# Patient Record
Sex: MALE | URBAN - METROPOLITAN AREA
[De-identification: names, ages, dates, MRNs, and addresses within clinical notes are randomized per-mention and may not be internally consistent; named-entity substitution may affect disease eponyms.]

---

## 2021-07-03 ENCOUNTER — INPATIENT (INPATIENT)
Facility: HOSPITAL | Age: 56
LOS: 0 days | Discharge: HOME | DRG: 247 | End: 2021-07-04
Attending: INTERNAL MEDICINE | Admitting: INTERNAL MEDICINE
Payer: MEDICARE

## 2021-07-03 VITALS — OXYGEN SATURATION: 85 %

## 2021-07-03 DIAGNOSIS — I21.3 ST ELEVATION (STEMI) MYOCARDIAL INFARCTION OF UNSPECIFIED SITE: ICD-10-CM

## 2021-07-03 DIAGNOSIS — Z98.1 ARTHRODESIS STATUS: Chronic | ICD-10-CM

## 2021-07-03 LAB
A1C WITH ESTIMATED AVERAGE GLUCOSE RESULT: 6.3 % — HIGH (ref 4–5.6)
ALBUMIN SERPL ELPH-MCNC: 4.2 G/DL — SIGNIFICANT CHANGE UP (ref 3.3–5)
ALP SERPL-CCNC: 73 U/L — SIGNIFICANT CHANGE UP (ref 40–120)
ALT FLD-CCNC: 18 U/L — SIGNIFICANT CHANGE UP (ref 10–45)
ANION GAP SERPL CALC-SCNC: 16 MMOL/L — SIGNIFICANT CHANGE UP (ref 5–17)
APPEARANCE UR: CLEAR — SIGNIFICANT CHANGE UP
AST SERPL-CCNC: 27 U/L — SIGNIFICANT CHANGE UP (ref 10–40)
BASOPHILS # BLD AUTO: 0.04 K/UL — SIGNIFICANT CHANGE UP (ref 0–0.2)
BASOPHILS NFR BLD AUTO: 0.4 % — SIGNIFICANT CHANGE UP (ref 0–2)
BILIRUB SERPL-MCNC: 0.4 MG/DL — SIGNIFICANT CHANGE UP (ref 0.2–1.2)
BILIRUB UR-MCNC: NEGATIVE — SIGNIFICANT CHANGE UP
BLD GP AB SCN SERPL QL: NEGATIVE — SIGNIFICANT CHANGE UP
BUN SERPL-MCNC: 7 MG/DL — SIGNIFICANT CHANGE UP (ref 7–23)
CALCIUM SERPL-MCNC: 9.6 MG/DL — SIGNIFICANT CHANGE UP (ref 8.4–10.5)
CHLORIDE SERPL-SCNC: 99 MMOL/L — SIGNIFICANT CHANGE UP (ref 96–108)
CK MB BLD-MCNC: 10.2 % — HIGH (ref 0–3.5)
CK MB BLD-MCNC: 10.6 % — HIGH (ref 0–3.5)
CK MB CFR SERPL CALC: 36.1 NG/ML — HIGH (ref 0–6.7)
CK MB CFR SERPL CALC: 40.7 NG/ML — HIGH (ref 0–6.7)
CK SERPL-CCNC: 353 U/L — HIGH (ref 30–200)
CK SERPL-CCNC: 383 U/L — HIGH (ref 30–200)
CO2 SERPL-SCNC: 22 MMOL/L — SIGNIFICANT CHANGE UP (ref 22–31)
COLOR SPEC: SIGNIFICANT CHANGE UP
CREAT SERPL-MCNC: 0.56 MG/DL — SIGNIFICANT CHANGE UP (ref 0.5–1.3)
DIFF PNL FLD: NEGATIVE — SIGNIFICANT CHANGE UP
EOSINOPHIL # BLD AUTO: 0.37 K/UL — SIGNIFICANT CHANGE UP (ref 0–0.5)
EOSINOPHIL NFR BLD AUTO: 3.4 % — SIGNIFICANT CHANGE UP (ref 0–6)
ESTIMATED AVERAGE GLUCOSE: 134 MG/DL — HIGH (ref 68–114)
GLUCOSE SERPL-MCNC: 125 MG/DL — HIGH (ref 70–99)
GLUCOSE UR QL: NEGATIVE — SIGNIFICANT CHANGE UP
HCT VFR BLD CALC: 45.9 % — SIGNIFICANT CHANGE UP (ref 39–50)
HGB BLD-MCNC: 15.7 G/DL — SIGNIFICANT CHANGE UP (ref 13–17)
IMM GRANULOCYTES NFR BLD AUTO: 0.6 % — SIGNIFICANT CHANGE UP (ref 0–1.5)
KETONES UR-MCNC: NEGATIVE — SIGNIFICANT CHANGE UP
LEUKOCYTE ESTERASE UR-ACNC: NEGATIVE — SIGNIFICANT CHANGE UP
LYMPHOCYTES # BLD AUTO: 27.7 % — SIGNIFICANT CHANGE UP (ref 13–44)
LYMPHOCYTES # BLD AUTO: 3.02 K/UL — SIGNIFICANT CHANGE UP (ref 1–3.3)
MAGNESIUM SERPL-MCNC: 2.1 MG/DL — SIGNIFICANT CHANGE UP (ref 1.6–2.6)
MCHC RBC-ENTMCNC: 30.7 PG — SIGNIFICANT CHANGE UP (ref 27–34)
MCHC RBC-ENTMCNC: 34.2 GM/DL — SIGNIFICANT CHANGE UP (ref 32–36)
MCV RBC AUTO: 89.6 FL — SIGNIFICANT CHANGE UP (ref 80–100)
MONOCYTES # BLD AUTO: 0.55 K/UL — SIGNIFICANT CHANGE UP (ref 0–0.9)
MONOCYTES NFR BLD AUTO: 5 % — SIGNIFICANT CHANGE UP (ref 2–14)
NEUTROPHILS # BLD AUTO: 6.86 K/UL — SIGNIFICANT CHANGE UP (ref 1.8–7.4)
NEUTROPHILS NFR BLD AUTO: 62.9 % — SIGNIFICANT CHANGE UP (ref 43–77)
NITRITE UR-MCNC: NEGATIVE — SIGNIFICANT CHANGE UP
NRBC # BLD: 0 /100 WBCS — SIGNIFICANT CHANGE UP (ref 0–0)
PH UR: 7 — SIGNIFICANT CHANGE UP (ref 5–8)
PHOSPHATE SERPL-MCNC: 3.2 MG/DL — SIGNIFICANT CHANGE UP (ref 2.5–4.5)
PLATELET # BLD AUTO: 230 K/UL — SIGNIFICANT CHANGE UP (ref 150–400)
POTASSIUM SERPL-MCNC: 4.2 MMOL/L — SIGNIFICANT CHANGE UP (ref 3.5–5.3)
POTASSIUM SERPL-SCNC: 4.2 MMOL/L — SIGNIFICANT CHANGE UP (ref 3.5–5.3)
PROT SERPL-MCNC: 6.9 G/DL — SIGNIFICANT CHANGE UP (ref 6–8.3)
PROT UR-MCNC: SIGNIFICANT CHANGE UP
RBC # BLD: 5.12 M/UL — SIGNIFICANT CHANGE UP (ref 4.2–5.8)
RBC # FLD: 12.4 % — SIGNIFICANT CHANGE UP (ref 10.3–14.5)
RH IG SCN BLD-IMP: POSITIVE — SIGNIFICANT CHANGE UP
SARS-COV-2 RNA SPEC QL NAA+PROBE: SIGNIFICANT CHANGE UP
SODIUM SERPL-SCNC: 137 MMOL/L — SIGNIFICANT CHANGE UP (ref 135–145)
SP GR SPEC: >1.05 (ref 1.01–1.02)
T4 FREE SERPL-MCNC: 1 NG/DL — SIGNIFICANT CHANGE UP (ref 0.9–1.8)
TROPONIN T, HIGH SENSITIVITY RESULT: 566 NG/L — HIGH (ref 0–51)
TROPONIN T, HIGH SENSITIVITY RESULT: 641 NG/L — HIGH (ref 0–51)
UROBILINOGEN FLD QL: NEGATIVE — SIGNIFICANT CHANGE UP
WBC # BLD: 10.9 K/UL — HIGH (ref 3.8–10.5)
WBC # FLD AUTO: 10.9 K/UL — HIGH (ref 3.8–10.5)

## 2021-07-03 PROCEDURE — 92941 PRQ TRLML REVSC TOT OCCL AMI: CPT | Mod: LC

## 2021-07-03 PROCEDURE — 99152 MOD SED SAME PHYS/QHP 5/>YRS: CPT

## 2021-07-03 PROCEDURE — 93458 L HRT ARTERY/VENTRICLE ANGIO: CPT | Mod: 26,59

## 2021-07-03 PROCEDURE — 93010 ELECTROCARDIOGRAM REPORT: CPT

## 2021-07-03 PROCEDURE — 99291 CRITICAL CARE FIRST HOUR: CPT

## 2021-07-03 PROCEDURE — 92978 ENDOLUMINL IVUS OCT C 1ST: CPT | Mod: 26,LC

## 2021-07-03 RX ORDER — TICAGRELOR 90 MG/1
90 TABLET ORAL EVERY 12 HOURS
Refills: 0 | Status: DISCONTINUED | OUTPATIENT
Start: 2021-07-04 | End: 2021-07-04

## 2021-07-03 RX ORDER — METOPROLOL TARTRATE 50 MG
12.5 TABLET ORAL EVERY 12 HOURS
Refills: 0 | Status: DISCONTINUED | OUTPATIENT
Start: 2021-07-03 | End: 2021-07-04

## 2021-07-03 RX ORDER — ASPIRIN/CALCIUM CARB/MAGNESIUM 324 MG
81 TABLET ORAL DAILY
Refills: 0 | Status: DISCONTINUED | OUTPATIENT
Start: 2021-07-03 | End: 2021-07-04

## 2021-07-03 RX ORDER — SODIUM CHLORIDE 9 MG/ML
500 INJECTION, SOLUTION INTRAVENOUS ONCE
Refills: 0 | Status: DISCONTINUED | OUTPATIENT
Start: 2021-07-03 | End: 2021-07-04

## 2021-07-03 RX ORDER — ATORVASTATIN CALCIUM 80 MG/1
40 TABLET, FILM COATED ORAL AT BEDTIME
Refills: 0 | Status: DISCONTINUED | OUTPATIENT
Start: 2021-07-03 | End: 2021-07-04

## 2021-07-03 RX ORDER — ENOXAPARIN SODIUM 100 MG/ML
40 INJECTION SUBCUTANEOUS DAILY
Refills: 0 | Status: DISCONTINUED | OUTPATIENT
Start: 2021-07-04 | End: 2021-07-04

## 2021-07-03 RX ORDER — CHLORHEXIDINE GLUCONATE 213 G/1000ML
1 SOLUTION TOPICAL
Refills: 0 | Status: DISCONTINUED | OUTPATIENT
Start: 2021-07-03 | End: 2021-07-04

## 2021-07-03 RX ORDER — ACETAMINOPHEN 500 MG
650 TABLET ORAL ONCE
Refills: 0 | Status: COMPLETED | OUTPATIENT
Start: 2021-07-03 | End: 2021-07-03

## 2021-07-03 RX ADMIN — Medication 650 MILLIGRAM(S): at 21:20

## 2021-07-03 RX ADMIN — ATORVASTATIN CALCIUM 40 MILLIGRAM(S): 80 TABLET, FILM COATED ORAL at 21:09

## 2021-07-03 RX ADMIN — Medication 12.5 MILLIGRAM(S): at 18:30

## 2021-07-03 RX ADMIN — TICAGRELOR 90 MILLIGRAM(S): 90 TABLET ORAL at 21:09

## 2021-07-03 RX ADMIN — Medication 650 MILLIGRAM(S): at 21:50

## 2021-07-03 NOTE — H&P ADULT - NSHPPHYSICALEXAM_GEN_ALL_CORE
T(C): --  HR: 62 (07-03-21 @ 12:30) (62 - 70)  BP: 131/78 (07-03-21 @ 12:30) (131/78 - 144/90)  RR: 9 (07-03-21 @ 12:30) (0 - 12)  SpO2: 94% (07-03-21 @ 12:30) (94% - 95%)    GENERAL: patient appears well, no acute distress, appropriate, pleasant  EYES: sclera clear, no exudates  ENMT: oropharynx clear without erythema, no exudates, moist mucous membranes  NECK: supple, soft, no thyromegaly noted  LUNGS: good air entry bilaterally, clear to auscultation, symmetric breath sounds, no wheezing or rhonchi appreciated  HEART: soft S1/S2, regular rate and rhythm, no murmurs noted, no lower extremity edema  GASTROINTESTINAL: abdomen is soft, nontender, nondistended, normoactive bowel sounds, no palpable masses  INTEGUMENT: good skin turgor, no lesions noted  MUSCULOSKELETAL: no clubbing or cyanosis, no obvious deformity  NEUROLOGIC: awake, alert, oriented x3, good muscle tone in 4 extremities, no obvious sensory deficits  PSYCHIATRIC: mood is good, affect is congruent, linear and logical thought process  HEME/LYMPH: no palpable supraclavicular nodules, no obvious ecchymosis or petechiae

## 2021-07-03 NOTE — H&P ADULT - ASSESSMENT
56y M pmhx HTN found to have STEMI moderate prox LAD dz, 99% occlusion OM1. EF 45% with [employer] synergy XD 2.75x8mm and 2.76t19jv stents placed.     #NEURO    JORGE LUIS      #PULMONARY    JORGE LUIS      #CARDIOVASCULAR    STEMI  - moderate prox LAD dz, 99% occlusion OM1. EF 45% with [employer] synergy XD 2.75x8mm and 2.75p58vf stents placed.   -ASA and brillinta  -Lipitor beta blocker Ace/ARB as able      #GASTROINTESTINAL    DASH TLC    Monitor bowel movements    otherwise JORGE LUIS    #RENAL    No acute issues    #INFECTIOUS DISEASE    No acute issues    #ENDOCRINE    No acute issues will f/u a1c    #HEME    No acute issues    #PROPHYLAXIS  -DVT - ASA/Brillinta  -GI - DASH/TLC    CODE STATUS

## 2021-07-03 NOTE — CHART NOTE - NSCHARTNOTEFT_GEN_A_CORE
Removal of Femoral Sheath    Pulses in the right lower extremity are palpable. The patient was placed in the supine position. The insertion site was identified and the sutures were removed per protocol.  The __8__ [place] femoral sheath was then removed. Direct pressure was applied for  __18____ minutes.     Monitoring of the right groin and both lower extremities including neuro-vascular checks and vital signs every 15 minutes x 4, then every 30 minutes x 2, then every 1 hour was ordered.    Complications: None    Comments: sand bag applied
Padua Prediction Score for VTE Risk within 24hours of admission:    Active malignancy:                                                    [  ] YES +3, [ x ] NO   Previous VTE (Excluding Superficial Vein Thrombosis): [  ] YES +3, [ x ] NO  Reduced mobility:                                                     [  ] YES +3, [ x ] NO  Already known thrombophilic condition:                     [  ] YES +3, [ x ] NO  Recent (</=1 month trauma and/or surgery):             [  ] YES +2, [ x ] NO  Elderly age (>/=70):                                                  [  ] YES +1, [x  ] NO  Heart and/or Respiratory Failure:                               [  ] YES +1, [ x ] NO   Acute MI and/or ischemic CVA:                                  [ x ] YES +1, [  ] NO   Acute infection and/or rheumatologic disorder:          [  ] YES +1, [ x ] NO   BMI>/= 30:                                                              [ x ] YES +1, [  ] NO   Ongoing hormonal treatment:                                   [  ] YES +1, [  x] NO    Total Score: [ 2 ]  points    [ x ] Padua Score <  3: Low Risk of VTE         - Chemical Thromboprophylaxis should be considered on case-by-case basis  [  ] Padua Score >/= 4: High Risk of VTE         - Chemical Thromboprophylaxis is recommended for nonpregnant patients without contraindications (Major bleeding, thrombocytopenia) who are >/=  18 years of age                         VTE Prophylaxis Recommendations:  Mechanical Pneumatic Compression Devices                                [  ]  Yes,  [  ] No, Contraindicated    Chemical VTE Prophylaxis (Heparin/ Lovenox/ Fondaparinux)        [  x ] Yes,  [  ] No              [ ] Contraindicated, because _____              [ ] Already receiving Systemic Anticoagulation

## 2021-07-03 NOTE — H&P ADULT - HISTORY OF PRESENT ILLNESS
56y M pmhx HTN originally presenting to [facility] for midsternal cp 20min prior to arrival. Standing waiting for bagel in store sudden sharp midsternal cp radiating to left arm assoc w nausea and sweating. 10/10 chest pain. no fever or chills. No previous hx of MI. In the ED he was given aspirin, brillinta, heparin gtt and transferred to [facility] for cardiac catheterization  56y M pmhx HTN originally presenting to [facility] for midsternal cp 20min prior to arrival. Standing waiting for bagel in store sudden sharp midsternal cp radiating to left arm assoc w nausea and sweating. 10/10 chest pain. no fever or chills. No previous hx of MI. In the ED, afebrile, HR 82, RR 20, /104, pulse ox 99. EKG with ST elevations II III aVF and ST depressions V1-V3.  he was given aspirin, brillinta, heparin gtt nitroglycerin and nitropaste and transferred to [facility] for cardiac catheterization  56y M pmhx HTN originally presenting to [facility] for midsternal cp 20min prior to arrival. Standing waiting for bagel in store sudden sharp midsternal cp radiating to left arm assoc w nausea and sweating. 10/10 chest pain. no fever or chills. No previous hx of MI. In the ED, afebrile, HR 82, RR 20, /104, pulse ox 99. EKG with ST elevations II III aVF and ST depressions V1-V3.  he was given aspirin, brillinta, heparin gtt nitroglycerin and nitropaste and transferred to [facility] for cardiac catheterization     Taken to cath lab found to have moderate prox LAD dz, 99% occlusion OM1. EF 45% with [employer] synergy XD 2.75x8mm and 2.99d11fl stents placed.

## 2021-07-03 NOTE — H&P ADULT - NSHPREVIEWOFSYSTEMS_GEN_ALL_CORE
REVIEW OF SYSTEMS:    CONSTITUTIONAL: +diaphoresis No weakness, fevers or chills  EYES: No visual changes  ENT: No vertigo or throat pain   NECK: No pain or stiffness  RESPIRATORY: No cough, wheezing, hemoptysis; No shortness of breath  CARDIOVASCULAR: +chest pain   GASTROINTESTINAL: +nausea. no vomiting, or hematemesis; No diarrhea or constipation. No melena or hematochezia.  GENITOURINARY: No dysuria, frequency or hematuria  NEUROLOGICAL: No numbness or weakness  SKIN: No itching, burning, rashes, or lesions   LYMPHATICS: No swollen lymph nodes.  All other review of systems is negative unless indicated above.

## 2021-07-03 NOTE — PROGRESS NOTE ADULT - SUBJECTIVE AND OBJECTIVE BOX
KEL GONZALEZ  MRN-[id number]  Patient is a 56y old  Male who presents with a chief complaint of STEMI cardiac cath (03 Jul 2021 12:37)    HPI:  56y M pmhx HTN originally presenting to [facility] for midsternal cp 20min prior to arrival. Standing waiting for bagel in store sudden sharp midsternal cp radiating to left arm assoc w nausea and sweating. 10/10 chest pain. no fever or chills. No previous hx of MI. In the ED, afebrile, HR 82, RR 20, /104, pulse ox 99. EKG with ST elevations II III aVF and ST depressions V1-V3.  he was given aspirin, brillinta, heparin gtt nitroglycerin and nitropaste and transferred to [facility] for cardiac catheterization     Taken to cath lab found to have moderate prox LAD dz, 99% occlusion OM1. EF 45% with [employer] synergy XD 2.75x8mm and 2.96l80vb stents placed.  (03 Jul 2021 12:37)      Surgery/Hospital Course:  7/3 Transferred to CICU    Today:    CONSTITUTIONAL: +diaphoresis No weakness, fevers or chills  EYES: No visual changes  ENT: No vertigo or throat pain   NECK: No pain or stiffness  RESPIRATORY: No cough, wheezing, hemoptysis; No shortness of breath  CARDIOVASCULAR: +chest pain   GASTROINTESTINAL: +nausea. no vomiting, or hematemesis; No diarrhea or constipation. No melena or hematochezia.  GENITOURINARY: No dysuria, frequency or hematuria  NEUROLOGICAL: No numbness or weakness  SKIN: No itching, burning, rashes, or lesions   LYMPHATICS: No swollen lymph nodes.  All other review of systems is negative unless indicated above.    ICU Vital Signs Last 24 Hrs  T(C): 37.1 (03 Jul 2021 19:00), Max: 37.1 (03 Jul 2021 19:00)  T(F): 98.8 (03 Jul 2021 19:00), Max: 98.8 (03 Jul 2021 19:00)  HR: 66 (03 Jul 2021 19:00) (60 - 74)  BP: 115/62 (03 Jul 2021 19:00) (102/65 - 160/72)  BP(mean): 77 (03 Jul 2021 19:00) (77 - 114)  ABP: --  ABP(mean): --  RR: 18 (03 Jul 2021 19:00) (0 - 36)  SpO2: 92% (03 Jul 2021 19:00) (85% - 98%)      Physical Exam:  GENERAL: patient appears well, no acute distress, appropriate, pleasant  EYES: sclera clear, no exudates  ENMT: oropharynx clear without erythema, no exudates, moist mucous membranes  NECK: supple, soft, no thyromegaly noted  LUNGS: good air entry bilaterally, clear to auscultation, symmetric breath sounds, no wheezing or rhonchi appreciated  HEART: soft S1/S2, regular rate and rhythm, no murmurs noted, no lower extremity edema  GASTROINTESTINAL: abdomen is soft, nontender, nondistended, normoactive bowel sounds, no palpable masses  INTEGUMENT: good skin turgor, no lesions noted  MUSCULOSKELETAL: no clubbing or cyanosis, no obvious deformity  NEUROLOGIC: awake, alert, oriented x3, good muscle tone in 4 extremities, no obvious sensory deficits  PSYCHIATRIC: mood is good, affect is congruent, linear and logical thought process  HEME/LYMPH: no palpable supraclavicular nodules, no obvious ecchymosis or petechiae      ============================I/O===========================   I&O's Detail    03 Jul 2021 07:01  -  03 Jul 2021 20:57  --------------------------------------------------------  IN:    Oral Fluid: 670 mL  Total IN: 670 mL    OUT:    Voided (mL): 800 mL  Total OUT: 800 mL    Total NET: -130 mL        ============================ LABS =========================                        15.7   10.90 )-----------( 230      ( 03 Jul 2021 13:02 )             45.9     07-03    137  |  99  |  7   ----------------------------<  125<H>  4.2   |  22  |  0.56    Ca    9.6      03 Jul 2021 13:02  Phos  3.2     07-03  Mg     2.1     07-03    TPro  6.9  /  Alb  4.2  /  TBili  0.4  /  DBili  x   /  AST  27  /  ALT  18  /  AlkPhos  73  07-03    LIVER FUNCTIONS - ( 03 Jul 2021 13:02 )  Alb: 4.2 g/dL / Pro: 6.9 g/dL / ALK PHOS: 73 U/L / ALT: 18 U/L / AST: 27 U/L / GGT: x               Urinalysis Basic - ( 03 Jul 2021 14:05 )    Color: Light Yellow / Appearance: Clear / SG: >1.050 / pH: x  Gluc: x / Ketone: Negative  / Bili: Negative / Urobili: Negative   Blood: x / Protein: Trace / Nitrite: Negative   Leuk Esterase: Negative / RBC: x / WBC x   Sq Epi: x / Non Sq Epi: x / Bacteria: x      ======================Micro/Rad/Cardio=================  CXR: Reviewed  Echo:Reviewed  ======================================================  PAST MEDICAL & SURGICAL HISTORY:  Hypertension    H/O spinal fusion      ====================ASSESSMENT ==============    STEMI moderate prox LAD dz, 99% occlusion OM1. EF 45% with [employer] synergy XD 2.75x8mm and 2.88o32ai stents placed.     Hyperglycemia      Plan:  ====================== NEUROLOGY=====================  Nonfocal, continue to monitor neuro status per ICU protocol.     ==================== RESPIRATORY======================  Comfortable on room air, SpO2 92%  - Continue to monitor SpO2 via pulse oximetry  - Encourage bedside spirometry     ====================CARDIOVASCULAR==================  STEMI  - moderate prox LAD dz, 99% occlusion OM1. EF 45% with [employer] synergy XD 2.75x8mm and 2.27e39wt stents placed.   -ASA and brillinta  -Lipitor beta blocker Ace/ARB as able    metoprolol tartrate 12.5 milliGRAM(s) Oral every 12 hours  atorvastatin 40 milliGRAM(s) Oral at bedtime  aspirin  chewable 81 milliGRAM(s) Oral daily  ===================HEMATOLOGIC/ONC ===================  H/H 15.7/45.9%, stable.   - Continue to monitor hemoglobin and hematocrit levels.     ===================== RENAL =========================  Continue to monitor I/Os, BUN/Creatinine, and urine output.   Goal net negative fluid balance. Replete lytes PRN. Keep K> 4 and Mg >2.     ==================== GASTROINTESTINAL===================  Tolerating PO DASH/TLC diet.   =======================    ENDOCRINE  =====================  Hyperglycemia ~134.   - monitor glucose for need to initiate sliding scale.     ========================INFECTIOUS DISEASE================  Afebrile, WBC within normal limits.   Monitor temperature and trend WBC. Monitor off abx.     Patient requires continuous monitoring with bedside rhythm monitoring, pulse ox monitoring, and intermittent blood gas analysis. Care plan discussed with ICU care team. Patient remained critical and at risk for life threatening decompensation.     By signing my name below, ITheodore Tiffany, attest that this documentation has been prepared under the direction and in the presence of BETY Cruz   Electronically signed: Ayah Jaimes Scribe, 07-03-21 @ 20:57    KARLIE, BETY Cruz  , personally performed the services described in this documentation. all medical record entries made by the eleniibnikunj were at my direction and in my presence. I have reviewed the chart and agree that the record reflects my personal performance and is accurate and complete  Electronically signed: BETY Cruz        KEL GONZALEZ  MRN-[id number]  Patient is a 56y old  Male who presents with a chief complaint of STEMI cardiac cath (03 Jul 2021 12:37)    HPI:  56y M pmhx HTN originally presenting to [facility] for midsternal cp 20min prior to arrival. Standing waiting for bagel in store sudden sharp midsternal cp radiating to left arm assoc w nausea and sweating. 10/10 chest pain. no fever or chills. No previous hx of MI. In the ED, afebrile, HR 82, RR 20, /104, pulse ox 99. EKG with ST elevations II III aVF and ST depressions V1-V3.  he was given aspirin, brillinta, heparin gtt nitroglycerin and nitropaste and transferred to [facility] for cardiac catheterization     Taken to cath lab found to have moderate prox LAD dz, 99% occlusion OM1. EF 45% with [employer] synergy XD 2.75x8mm and 2.68k64yx stents placed.  (03 Jul 2021 12:37)      Surgery/Hospital Course:  7/3 Transferred to CICU s/p STEMI/LHC w/ PCI     CONSTITUTIONAL +HA, No weakness, fevers or chills  EYES: No visual changes  NECK: No pain or stiffness  RESPIRATORY: No cough, wheezing, hemoptysis; No shortness of breath  CARDIOVASCULAR: no CP, palpitations, SOB, pain down L arm  GASTROINTESTINAL: no vomiting, or hematemesis; No diarrhea or constipation. No melena or hematochezia.  GENITOURINARY: No dysuria, frequency or hematuria  NEUROLOGICAL: No numbness or weakness  SKIN: No itching, burning, rashes, or lesions     ICU Vital Signs Last 24 Hrs  T(C): 37.1 (03 Jul 2021 19:00), Max: 37.1 (03 Jul 2021 19:00)  T(F): 98.8 (03 Jul 2021 19:00), Max: 98.8 (03 Jul 2021 19:00)  HR: 66 (03 Jul 2021 19:00) (60 - 74)  BP: 115/62 (03 Jul 2021 19:00) (102/65 - 160/72)  BP(mean): 77 (03 Jul 2021 19:00) (77 - 114)  ABP: --  ABP(mean): --  RR: 18 (03 Jul 2021 19:00) (0 - 36)  SpO2: 92% (03 Jul 2021 19:00) (85% - 98%)      Physical Exam:  GENERAL: patient appears well, no acute distress, appropriate, pleasant  EYES: sclera clear, no exudates. EOMI, PERRLA   LUNGS: clear to auscultation, symmetric breath sounds, no wheezing or rhonchi appreciated  HEART: S1/S2, regular rate and rhythm, no murmurs noted, no lower extremity edema or JVD   GASTROINTESTINAL: abdomen is soft, nontender, nondistended, normoactive bowel sounds, no palpable masses  INTEGUMENT: good skin turgor, no lesions noted  NEUROLOGIC: awake, alert, oriented x3, good muscle tone in 4 extremities, no obvious sensory deficits  PSYCHIATRIC: mood is good, affect is congruent, linear and logical thought process  R. groin Catheterization site s/p sheath removal w/ no induration, ecchymosis, oozing.       ============================I/O===========================   I&O's Detail    03 Jul 2021 07:01  -  03 Jul 2021 20:57  --------------------------------------------------------  IN:    Oral Fluid: 670 mL  Total IN: 670 mL    OUT:    Voided (mL): 800 mL  Total OUT: 800 mL    Total NET: -130 mL        ============================ LABS =========================                        15.7   10.90 )-----------( 230      ( 03 Jul 2021 13:02 )             45.9     07-03    137  |  99  |  7   ----------------------------<  125<H>  4.2   |  22  |  0.56    Ca    9.6      03 Jul 2021 13:02  Phos  3.2     07-03  Mg     2.1     07-03    TPro  6.9  /  Alb  4.2  /  TBili  0.4  /  DBili  x   /  AST  27  /  ALT  18  /  AlkPhos  73  07-03    LIVER FUNCTIONS - ( 03 Jul 2021 13:02 )  Alb: 4.2 g/dL / Pro: 6.9 g/dL / ALK PHOS: 73 U/L / ALT: 18 U/L / AST: 27 U/L / GGT: x               Urinalysis Basic - ( 03 Jul 2021 14:05 )    Color: Light Yellow / Appearance: Clear / SG: >1.050 / pH: x  Gluc: x / Ketone: Negative  / Bili: Negative / Urobili: Negative   Blood: x / Protein: Trace / Nitrite: Negative   Leuk Esterase: Negative / RBC: x / WBC x   Sq Epi: x / Non Sq Epi: x / Bacteria: x      ======================Micro/Rad/Cardio=================  CXR: Reviewed  Echo:Reviewed  ======================================================  PAST MEDICAL & SURGICAL HISTORY:  Hypertension    H/O spinal fusion      ====================ASSESSMENT ==============    STEMI moderate prox LAD dz, 99% occlusion OM1. EF 45% with [employer] synergy XD 2.75x8mm and 2.08n44wa stents placed.     Hyperglycemia      Plan:  ====================== NEUROLOGY=====================  Nonfocal, A&Ox3 continue to monitor neuro status per ICU protocol.     ==================== RESPIRATORY======================  Comfortable on room air, SpO2 92%  - Continue to monitor SpO2 via pulse oximetry    ====================CARDIOVASCULAR==================  STEMI  - moderate prox LAD dz, 99% OM1 PARTH x1. EF 45%. LVEDP 13  - ASA and brillinta for DAPT, High intensity statin w/ Lipitor 80 bedtime   - Initiate GDMT as tolerable by pt's vital signs: Lopressor 12.5 bid -> Add ACEi/ARB in AM pending labs and vitals in AM   - Trend CE and f/u transthoraci echocardiogram in the AM     Hx of HTN  - GDMT as above     metoprolol tartrate 12.5 milliGRAM(s) Oral every 12 hours  atorvastatin 40 milliGRAM(s) Oral at bedtime  aspirin  chewable 81 milliGRAM(s) Oral daily  ===================HEMATOLOGIC/ONC ===================  H/H 15.7/45.9%, stable.   - Continue to monitor hemoglobin and hematocrit levels.     VTE ppx  - may begin on Lovenox 40 daily in AM, bt if CE peak, would encourage ambulation     ===================== RENAL =========================  Continue to monitor I/Os, BUN/Creatinine, and urine output.   Goal net negative fluid balance. Replete lytes PRN. Keep K> 4 and Mg >2.     ==================== GASTROINTESTINAL===================  PO DASH/TLC diet.   =======================    ENDOCRINE  =====================  Pre-DM2, A1c 6.3   - monitor glucose for need to initiate sliding scale.     F/u TSH and lipid panel     ========================INFECTIOUS DISEASE================  Afebrile, WBC within normal limits.   Monitor temperature and trend WBC. Monitor off abx.     Patient requires continuous monitoring with bedside rhythm monitoring, pulse ox monitoring, and intermittent blood gas analysis. Care plan discussed with ICU care team. Patient remained critical and at risk for life threatening decompensation.     By signing my name below, ITheodore Tiffany, attest that this documentation has been prepared under the direction and in the presence of BETY Cruz   Electronically signed: Ayah Jaimes Scribe, 07-03-21 @ 20:57    Andrzej ZIEGLER PA  , personally performed the services described in this documentation. all medical record entries made by the eleniibe were at my direction and in my presence. I have reviewed the chart and agree that the record reflects my personal performance and is accurate and complete  Electronically signed: BETY Cruz

## 2021-07-03 NOTE — H&P ADULT - ATTENDING COMMENTS
Patient presents with acute coronary syndrome, now status post PCI.  TTE to assess LV.  Monitor for arrhythmia.  Continue DAPT, high intensity statin therapy.  Beta-blocker and ARB as BP and renal function permit.

## 2021-07-04 ENCOUNTER — TRANSCRIPTION ENCOUNTER (OUTPATIENT)
Age: 56
End: 2021-07-04

## 2021-07-04 VITALS
DIASTOLIC BLOOD PRESSURE: 75 MMHG | HEART RATE: 58 BPM | RESPIRATION RATE: 16 BRPM | OXYGEN SATURATION: 95 % | TEMPERATURE: 98 F | SYSTOLIC BLOOD PRESSURE: 138 MMHG

## 2021-07-04 LAB
ALBUMIN SERPL ELPH-MCNC: 3.8 G/DL — SIGNIFICANT CHANGE UP (ref 3.3–5)
ALP SERPL-CCNC: 70 U/L — SIGNIFICANT CHANGE UP (ref 40–120)
ALT FLD-CCNC: 21 U/L — SIGNIFICANT CHANGE UP (ref 10–45)
ANION GAP SERPL CALC-SCNC: 13 MMOL/L — SIGNIFICANT CHANGE UP (ref 5–17)
AST SERPL-CCNC: 34 U/L — SIGNIFICANT CHANGE UP (ref 10–40)
BASOPHILS # BLD AUTO: 0.04 K/UL — SIGNIFICANT CHANGE UP (ref 0–0.2)
BASOPHILS NFR BLD AUTO: 0.4 % — SIGNIFICANT CHANGE UP (ref 0–2)
BILIRUB SERPL-MCNC: 0.5 MG/DL — SIGNIFICANT CHANGE UP (ref 0.2–1.2)
BUN SERPL-MCNC: 10 MG/DL — SIGNIFICANT CHANGE UP (ref 7–23)
CALCIUM SERPL-MCNC: 9.2 MG/DL — SIGNIFICANT CHANGE UP (ref 8.4–10.5)
CHLORIDE SERPL-SCNC: 102 MMOL/L — SIGNIFICANT CHANGE UP (ref 96–108)
CK MB BLD-MCNC: 9.8 % — HIGH (ref 0–3.5)
CK MB CFR SERPL CALC: 28.1 NG/ML — HIGH (ref 0–6.7)
CK SERPL-CCNC: 288 U/L — HIGH (ref 30–200)
CO2 SERPL-SCNC: 22 MMOL/L — SIGNIFICANT CHANGE UP (ref 22–31)
CREAT SERPL-MCNC: 0.65 MG/DL — SIGNIFICANT CHANGE UP (ref 0.5–1.3)
EOSINOPHIL # BLD AUTO: 0.52 K/UL — HIGH (ref 0–0.5)
EOSINOPHIL NFR BLD AUTO: 5.1 % — SIGNIFICANT CHANGE UP (ref 0–6)
GLUCOSE SERPL-MCNC: 158 MG/DL — HIGH (ref 70–99)
HCT VFR BLD CALC: 46.7 % — SIGNIFICANT CHANGE UP (ref 39–50)
HGB BLD-MCNC: 15.8 G/DL — SIGNIFICANT CHANGE UP (ref 13–17)
IMM GRANULOCYTES NFR BLD AUTO: 0.7 % — SIGNIFICANT CHANGE UP (ref 0–1.5)
LYMPHOCYTES # BLD AUTO: 2.54 K/UL — SIGNIFICANT CHANGE UP (ref 1–3.3)
LYMPHOCYTES # BLD AUTO: 25.1 % — SIGNIFICANT CHANGE UP (ref 13–44)
MAGNESIUM SERPL-MCNC: 2.2 MG/DL — SIGNIFICANT CHANGE UP (ref 1.6–2.6)
MCHC RBC-ENTMCNC: 31.2 PG — SIGNIFICANT CHANGE UP (ref 27–34)
MCHC RBC-ENTMCNC: 33.8 GM/DL — SIGNIFICANT CHANGE UP (ref 32–36)
MCV RBC AUTO: 92.1 FL — SIGNIFICANT CHANGE UP (ref 80–100)
MONOCYTES # BLD AUTO: 0.7 K/UL — SIGNIFICANT CHANGE UP (ref 0–0.9)
MONOCYTES NFR BLD AUTO: 6.9 % — SIGNIFICANT CHANGE UP (ref 2–14)
NEUTROPHILS # BLD AUTO: 6.26 K/UL — SIGNIFICANT CHANGE UP (ref 1.8–7.4)
NEUTROPHILS NFR BLD AUTO: 61.8 % — SIGNIFICANT CHANGE UP (ref 43–77)
NRBC # BLD: 0 /100 WBCS — SIGNIFICANT CHANGE UP (ref 0–0)
PHOSPHATE SERPL-MCNC: 3.2 MG/DL — SIGNIFICANT CHANGE UP (ref 2.5–4.5)
PLATELET # BLD AUTO: 219 K/UL — SIGNIFICANT CHANGE UP (ref 150–400)
POTASSIUM SERPL-MCNC: 4.3 MMOL/L — SIGNIFICANT CHANGE UP (ref 3.5–5.3)
POTASSIUM SERPL-SCNC: 4.3 MMOL/L — SIGNIFICANT CHANGE UP (ref 3.5–5.3)
PROT SERPL-MCNC: 6.5 G/DL — SIGNIFICANT CHANGE UP (ref 6–8.3)
RBC # BLD: 5.07 M/UL — SIGNIFICANT CHANGE UP (ref 4.2–5.8)
RBC # FLD: 12.8 % — SIGNIFICANT CHANGE UP (ref 10.3–14.5)
SODIUM SERPL-SCNC: 137 MMOL/L — SIGNIFICANT CHANGE UP (ref 135–145)
TROPONIN T, HIGH SENSITIVITY RESULT: 519 NG/L — HIGH (ref 0–51)
TSH SERPL-MCNC: 1.34 UIU/ML — SIGNIFICANT CHANGE UP (ref 0.27–4.2)
WBC # BLD: 10.13 K/UL — SIGNIFICANT CHANGE UP (ref 3.8–10.5)
WBC # FLD AUTO: 10.13 K/UL — SIGNIFICANT CHANGE UP (ref 3.8–10.5)

## 2021-07-04 PROCEDURE — 99239 HOSP IP/OBS DSCHRG MGMT >30: CPT

## 2021-07-04 PROCEDURE — 83735 ASSAY OF MAGNESIUM: CPT

## 2021-07-04 PROCEDURE — 86901 BLOOD TYPING SEROLOGIC RH(D): CPT

## 2021-07-04 PROCEDURE — C9606: CPT | Mod: LC

## 2021-07-04 PROCEDURE — 92978 ENDOLUMINL IVUS OCT C 1ST: CPT | Mod: LC

## 2021-07-04 PROCEDURE — 82550 ASSAY OF CK (CPK): CPT

## 2021-07-04 PROCEDURE — 85025 COMPLETE CBC W/AUTO DIFF WBC: CPT

## 2021-07-04 PROCEDURE — 93458 L HRT ARTERY/VENTRICLE ANGIO: CPT | Mod: 59

## 2021-07-04 PROCEDURE — 86850 RBC ANTIBODY SCREEN: CPT

## 2021-07-04 PROCEDURE — 93010 ELECTROCARDIOGRAM REPORT: CPT

## 2021-07-04 PROCEDURE — C1769: CPT

## 2021-07-04 PROCEDURE — 84443 ASSAY THYROID STIM HORMONE: CPT

## 2021-07-04 PROCEDURE — 84439 ASSAY OF FREE THYROXINE: CPT

## 2021-07-04 PROCEDURE — 84100 ASSAY OF PHOSPHORUS: CPT

## 2021-07-04 PROCEDURE — C1753: CPT

## 2021-07-04 PROCEDURE — C1894: CPT

## 2021-07-04 PROCEDURE — 82553 CREATINE MB FRACTION: CPT

## 2021-07-04 PROCEDURE — 99152 MOD SED SAME PHYS/QHP 5/>YRS: CPT

## 2021-07-04 PROCEDURE — 93306 TTE W/DOPPLER COMPLETE: CPT | Mod: 26

## 2021-07-04 PROCEDURE — C1725: CPT

## 2021-07-04 PROCEDURE — 81003 URINALYSIS AUTO W/O SCOPE: CPT

## 2021-07-04 PROCEDURE — U0003: CPT

## 2021-07-04 PROCEDURE — 93306 TTE W/DOPPLER COMPLETE: CPT

## 2021-07-04 PROCEDURE — 99153 MOD SED SAME PHYS/QHP EA: CPT

## 2021-07-04 PROCEDURE — 86900 BLOOD TYPING SEROLOGIC ABO: CPT

## 2021-07-04 PROCEDURE — C1887: CPT

## 2021-07-04 PROCEDURE — 93005 ELECTROCARDIOGRAM TRACING: CPT

## 2021-07-04 PROCEDURE — 83036 HEMOGLOBIN GLYCOSYLATED A1C: CPT

## 2021-07-04 PROCEDURE — 80053 COMPREHEN METABOLIC PANEL: CPT

## 2021-07-04 PROCEDURE — 84484 ASSAY OF TROPONIN QUANT: CPT

## 2021-07-04 PROCEDURE — C1874: CPT

## 2021-07-04 RX ORDER — SODIUM CHLORIDE 9 MG/ML
500 INJECTION INTRAMUSCULAR; INTRAVENOUS; SUBCUTANEOUS ONCE
Refills: 0 | Status: COMPLETED | OUTPATIENT
Start: 2021-07-04 | End: 2021-07-04

## 2021-07-04 RX ORDER — METFORMIN HYDROCHLORIDE 850 MG/1
1 TABLET ORAL
Qty: 60 | Refills: 2
Start: 2021-07-04

## 2021-07-04 RX ORDER — ATORVASTATIN CALCIUM 80 MG/1
80 TABLET, FILM COATED ORAL AT BEDTIME
Refills: 0 | Status: DISCONTINUED | OUTPATIENT
Start: 2021-07-04 | End: 2021-07-04

## 2021-07-04 RX ORDER — METOPROLOL TARTRATE 50 MG
1 TABLET ORAL
Qty: 0 | Refills: 0 | DISCHARGE
Start: 2021-07-04

## 2021-07-04 RX ORDER — METOPROLOL TARTRATE 50 MG
25 TABLET ORAL DAILY
Refills: 0 | Status: DISCONTINUED | OUTPATIENT
Start: 2021-07-04 | End: 2021-07-04

## 2021-07-04 RX ORDER — LOSARTAN POTASSIUM 100 MG/1
25 TABLET, FILM COATED ORAL DAILY
Refills: 0 | Status: DISCONTINUED | OUTPATIENT
Start: 2021-07-04 | End: 2021-07-04

## 2021-07-04 RX ORDER — CLOPIDOGREL BISULFATE 75 MG/1
600 TABLET, FILM COATED ORAL ONCE
Refills: 0 | Status: COMPLETED | OUTPATIENT
Start: 2021-07-04 | End: 2021-07-04

## 2021-07-04 RX ORDER — ATORVASTATIN CALCIUM 80 MG/1
1 TABLET, FILM COATED ORAL
Qty: 30 | Refills: 2
Start: 2021-07-04

## 2021-07-04 RX ORDER — TICAGRELOR 90 MG/1
1 TABLET ORAL
Qty: 0 | Refills: 0 | DISCHARGE
Start: 2021-07-04

## 2021-07-04 RX ORDER — LOSARTAN POTASSIUM 100 MG/1
1 TABLET, FILM COATED ORAL
Qty: 0 | Refills: 0 | DISCHARGE
Start: 2021-07-04

## 2021-07-04 RX ORDER — ASPIRIN/CALCIUM CARB/MAGNESIUM 324 MG
1 TABLET ORAL
Qty: 0 | Refills: 0 | DISCHARGE
Start: 2021-07-04

## 2021-07-04 RX ADMIN — Medication 81 MILLIGRAM(S): at 11:20

## 2021-07-04 RX ADMIN — SODIUM CHLORIDE 500 MILLILITER(S): 9 INJECTION INTRAMUSCULAR; INTRAVENOUS; SUBCUTANEOUS at 01:25

## 2021-07-04 RX ADMIN — Medication 25 MILLIGRAM(S): at 05:52

## 2021-07-04 RX ADMIN — CHLORHEXIDINE GLUCONATE 1 APPLICATION(S): 213 SOLUTION TOPICAL at 05:43

## 2021-07-04 RX ADMIN — CLOPIDOGREL BISULFATE 600 MILLIGRAM(S): 75 TABLET, FILM COATED ORAL at 15:04

## 2021-07-04 RX ADMIN — LOSARTAN POTASSIUM 25 MILLIGRAM(S): 100 TABLET, FILM COATED ORAL at 11:20

## 2021-07-04 RX ADMIN — ATORVASTATIN CALCIUM 80 MILLIGRAM(S): 80 TABLET, FILM COATED ORAL at 15:04

## 2021-07-04 NOTE — DISCHARGE NOTE PROVIDER - NSDCFUADDINST_GEN_ALL_CORE_FT
To physician:    Please follow up on Echo, Lipoprotein A assay, and pre-diabetes. Patient may benefit from SGLT2 inhibitor.

## 2021-07-04 NOTE — DISCHARGE NOTE PROVIDER - NSDCMRMEDTOKEN_GEN_ALL_CORE_FT
aspirin 81 mg oral tablet, chewable: 1 tab(s) orally once a day  atorvastatin 80 mg oral tablet: 1 tab(s) orally once a day (at bedtime)   losartan 25 mg oral tablet: 1 tab(s) orally once a day  metFORMIN 500 mg oral tablet: 1 tab(s) orally 2 times a day   metoprolol succinate 25 mg oral tablet, extended release: 1 tab(s) orally once a day  ticagrelor 90 mg oral tablet: 1 tab(s) orally every 12 hours   aspirin 81 mg oral tablet, chewable: 1 tab(s) orally once a day  atorvastatin 80 mg oral tablet: 1 tab(s) orally once a day (at bedtime)   losartan 25 mg oral tablet: 1 tab(s) orally once a day  metFORMIN 500 mg oral tablet: 1 tab(s) orally 2 times a day   metoprolol succinate 25 mg oral tablet, extended release: 1 tab(s) orally once a day  Plavix 75 mg oral tablet: 1 tab(s) orally once a day

## 2021-07-04 NOTE — PROGRESS NOTE ADULT - ASSESSMENT
====================ASSESSMENT ==============    56y M pmhx HTN originally presenting to [facility] for midsternal cp 20min prior to arrival, no s/p LHC with PCI PARTH in OM1 (99% occlusion) admitted to CCU for post PCI care and monitoring.     STEMI moderate prox LAD dz, 99% occlusion OM1. EF 45% with [place] scientific synergy XD 2.75x8mm and 2.51v82os stents placed.     Hyperglycemia      Plan:  ====================== NEUROLOGY=====================  Nonfocal, A&Ox3 continue to monitor neuro status per ICU protocol.     ==================== RESPIRATORY======================  Comfortable on room air, SpO2 92%  - Continue to monitor SpO2 via pulse oximetry    ====================CARDIOVASCULAR==================  STEMI  - moderate prox LAD dz, 99% OM1 PARTH x1. EF 45%. LVEDP 13  - ASA and brillinta for DAPT, High intensity statin w/ Lipitor 80 bedtime   - Initiate GDMT as tolerable by pt's vital signs: Lopressor 12.5 bid -> Add ACEi/ARB in AM pending labs and vitals in AM   - Trend CE and f/u transthoraci echocardiogram in the AM     Hx of HTN  - GDMT as above     metoprolol tartrate 12.5 milliGRAM(s) Oral every 12 hours  atorvastatin 40 milliGRAM(s) Oral at bedtime  aspirin  chewable 81 milliGRAM(s) Oral daily  ===================HEMATOLOGIC/ONC ===================  H/H 15.7/45.9%, stable.   - Continue to monitor hemoglobin and hematocrit levels.     VTE ppx  - may begin on Lovenox 40 daily in AM, bt if CE peak, would encourage ambulation     ===================== RENAL =========================  Continue to monitor I/Os, BUN/Creatinine, and urine output.   Goal net negative fluid balance. Replete lytes PRN. Keep K> 4 and Mg >2.     ==================== GASTROINTESTINAL===================  PO DASH/TLC diet.   =======================    ENDOCRINE  =====================  Pre-DM2, A1c 6.3   - monitor glucose for need to initiate sliding scale.     F/u TSH and lipid panel     ========================INFECTIOUS DISEASE================  Afebrile, WBC within normal limits.   Monitor temperature and trend WBC. Monitor off abx.  ====================ASSESSMENT ==============    56y M pmhx HTN originally presenting to [facility] for midsternal cp 20min prior to arrival, no s/p LHC with PCI PARTH in OM1 (99% occlusion) admitted to CCU for post PCI care and monitoring.     STEMI moderate prox LAD dz, 99% occlusion OM1. EF 45% with [place] scientific synergy XD 2.75x8mm and 2.45r78sk stents placed.     Hyperglycemia      Plan:  ====================== NEUROLOGY=====================  Nonfocal, A&Ox3 continue to monitor neuro status per ICU protocol.     ==================== RESPIRATORY======================  Comfortable on room air, SpO2 92%  - Continue to monitor SpO2 via pulse oximetry    ====================CARDIOVASCULAR==================  STEMI  - moderate prox LAD dz, 99% OM1 PARTH x1. EF 45%. LVEDP 13  - ASA and brillinta for DAPT, High intensity statin w/ Lipitor 80 bedtime   - Initiate GDMT as tolerable by pt's vital signs: Lopressor 12.5 bid -> Add ACEi/ARB in AM pending labs and vitals in AM   - Trend CE and f/u transthoraci echocardiogram in the AM   -Sent lipoprotein A assay  -Atorvastatin 80 on d/c  -f/u TTE    Hx of HTN  - GDMT as above     metoprolol tartrate 12.5 milliGRAM(s) Oral every 12 hours  atorvastatin 40 milliGRAM(s) Oral at bedtime  aspirin  chewable 81 milliGRAM(s) Oral daily  ===================HEMATOLOGIC/ONC ===================  H/H 15.7/45.9%, stable.   - Continue to monitor hemoglobin and hematocrit levels.     VTE ppx  - may begin on Lovenox 40 daily in AM, bt if CE peak, would encourage ambulation     ===================== RENAL =========================  Continue to monitor I/Os, BUN/Creatinine, and urine output.   Goal net negative fluid balance. Replete lytes PRN. Keep K> 4 and Mg >2.     ==================== GASTROINTESTINAL===================  PO DASH/TLC diet.   =======================    ENDOCRINE  =====================  Pre-DM2, A1c 6.3   - monitor glucose for need to initiate sliding scale.   -D/C on metformin 500mg BID    F/u TSH and lipid panel     ========================INFECTIOUS DISEASE================  Afebrile, WBC within normal limits.   Monitor temperature and trend WBC. Monitor off abx.

## 2021-07-04 NOTE — DISCHARGE NOTE NURSING/CASE MANAGEMENT/SOCIAL WORK - PATIENT PORTAL LINK FT
You can access the FollowMyHealth Patient Portal offered by [facility] by registering at the following website: [email or web address]. By joining [facility]’s FollowMyHealth portal, you will also be able to view your health information using other applications (apps) compatible with our system.

## 2021-07-04 NOTE — DISCHARGE NOTE PROVIDER - CARE PROVIDER_API CALL
Iraj Salas (MD)  Cardiovascular Disease; Internal Medicine  [address] [place] [address]  Phone: [phone]  Fax: [phone]  Follow Up Time:

## 2021-07-04 NOTE — DISCHARGE NOTE PROVIDER - HOSPITAL COURSE
56y M pmhx HTN originally presenting to [facility] for midsternal cp 20min prior to arrival. Standing waiting for bagel in store sudden sharp midsternal cp radiating to left arm assoc w nausea and sweating. 10/10 chest pain. no fever or chills. No previous hx of MI. In the ED, afebrile, HR 82, RR 20, /104, pulse ox 99. EKG with ST elevations II III aVF and ST depressions V1-V3.  he was given aspirin, brillinta, heparin gtt nitroglycerin and nitropaste and transferred to [facility] for cardiac catheterization     Taken to cath lab found to have moderate prox LAD dz, 99% occlusion OM1. EF 45% with [place] scientific synergy XD 2.75x8mm and 2.42n50ij stents placed.     Transferred to CICU post cath for cardiac monitoring and continuation of post-stent medication regimen.     Patient received echo while in CICU, has been chest pain free. Able to get out of bed and ambulate well.     Discussed with attending, patient medically cleared for discharge.

## 2021-07-04 NOTE — DISCHARGE NOTE PROVIDER - NSDCCPCAREPLAN_GEN_ALL_CORE_FT
PRINCIPAL DISCHARGE DIAGNOSIS  Diagnosis: STEMI (ST elevation myocardial infarction)  Assessment and Plan of Treatment:       SECONDARY DISCHARGE DIAGNOSES  Diagnosis: Pre-diabetes  Assessment and Plan of Treatment:      PRINCIPAL DISCHARGE DIAGNOSIS  Diagnosis: STEMI (ST elevation myocardial infarction)  Assessment and Plan of Treatment: Call your doctor if you have unusual chest pain, pressure, or discomfort, shortness of breath, nausea, vomiting, burping, heartburn, tingling upper body parts, sweating, cold, clammy sking, racing heartbeat  Call 911 if you think you are having a heart attack  Take all cardiac medications as prescribed - notify your doctor if you have any side effects  Follow cardiac diet - avoid fatty & fried foods, don't eat too much red meat, eat lots of fruits & vegetables, dairy products should be low fat  Lose weight if you are overweight  Become more active with walking, gardening, or any other activity that gets you to move  Your groin site  care:   No heavy lifting, strenuous activity, bending, straining or unnecessary stair climbing  for 2 weeks. No sex for 1 week.  No driving for 2 days. You may shower 24 hours following procedure but avoid baths and swimming for 1 week. Check groin site for bleeding and/or swelling daily following procedure. Call your doctor/cardiologist immediately should it occur or if you have increased/persistent pain at the site. Follow up with your cardiologist in 1- 2 weeks. You may call [facility] at [phone] or [phone] after office hours and weekends  with any questions or concerns following your procedure. Take medications as prescribed.        SECONDARY DISCHARGE DIAGNOSES  Diagnosis: Pre-diabetes  Assessment and Plan of Treatment:

## 2021-07-04 NOTE — PROGRESS NOTE ADULT - SUBJECTIVE AND OBJECTIVE BOX
Patient is a 56y old  Male who presents with a chief complaint of STEMI cardiac cath (2021 20:56)    HPI:  56y M pmhx HTN originally presenting to [facility] for midsternal cp 20min prior to arrival. Standing waiting for [employer] in store sudden sharp midsternal cp radiating to left arm assoc w nausea and sweating. 10/10 chest pain. no fever or chills. No previous hx of MI. In the ED, afebrile, HR 82, RR 20, /104, pulse ox 99. EKG with ST elevations II III aVF and ST depressions V1-V3.  he was given aspirin, brillinta, heparin gtt nitroglycerin and nitropaste and transferred to [facility] for cardiac catheterization     Taken to cath lab found to have moderate prox LAD dz, 99% occlusion OM1. EF 45% with [place] scientific synergy XD 2.75x8mm and 2.40u40jq stents placed.  (2021 12:37)       INTERVAL HPI/OVERNIGHT EVENTS:   No overnight events   Afebrile, hemodynamically stable     Subjective:    ICU Vital Signs Last 24 Hrs  T(C): 37.3 (2021 23:00), Max: 37.3 (2021 23:00)  T(F): 99.1 (2021 23:00), Max: 99.1 (2021 23:00)  HR: 62 (2021 05:00) (54 - 74)  BP: 128/80 (2021 05:00) (85/48 - 160/72)  BP(mean): 97 (2021 05:00) (58 - 114)  ABP: --  ABP(mean): --  RR: 18 (2021 05:00) (0 - 36)  SpO2: 97% (2021 05:00) (85% - 98%)    I&O's Summary    2021 07:01  -  2021 07:00  --------------------------------------------------------  IN: 1410 mL / OUT: 800 mL / NET: 610 mL          Daily Height in cm: 162.56 (2021 11:30)    Daily Weight in k.3 (2021 05:00)    Adult Advanced Hemodynamics Last 24 Hrs  CVP(mm Hg): --  CVP(cm H2O): --  CO: --  CI: --  PA: --  PA(mean): --  PCWP: --  SVR: --  SVRI: --  PVR: --  PVRI: --    EKG/Telemetry Events:    MEDICATIONS  (STANDING):  aspirin  chewable 81 milliGRAM(s) Oral daily  atorvastatin 80 milliGRAM(s) Oral at bedtime  chlorhexidine 4% Liquid 1 Application(s) Topical <User Schedule>  enoxaparin Injectable 40 milliGRAM(s) SubCutaneous daily  metoprolol succinate ER 25 milliGRAM(s) Oral daily  ticagrelor 90 milliGRAM(s) Oral every 12 hours    MEDICATIONS  (PRN):      PHYSICAL EXAM:  GENERAL:   HEAD:  Atraumatic, Normocephalic  EYES: EOMI, PERRLA, conjunctiva and sclera clear  NECK: Supple, No JVD, Normal thyroid, no enlarged nodes  NERVOUS SYSTEM:  Alert & Awake.   CHEST/LUNG: B/L good air entry; No rales, rhonchi, or wheezing  HEART: S1S2 normal, no S3, Regular rate and rhythm; No murmurs  ABDOMEN: Soft, Nontender, Nondistended; Bowel sounds present  EXTREMITIES:  2+ Peripheral Pulses, No clubbing, cyanosis, or edema  LYMPH: No lymphadenopathy noted  SKIN: No rashes or lesions    LABS:                        15.8   10.13 )-----------( 219      ( 2021 05:10 )             46.7     07-04    137  |  102  |  10  ----------------------------<  158<H>  4.3   |  22  |  0.65    Ca    9.2      2021 05:10  Phos  3.2     07-04  Mg     2.2     07-04    TPro  6.5  /  Alb  3.8  /  TBili  0.5  /  DBili  x   /  AST  34  /  ALT  21  /  AlkPhos  70  07-04    LIVER FUNCTIONS - ( 2021 05:10 )  Alb: 3.8 g/dL / Pro: 6.5 g/dL / ALK PHOS: 70 U/L / ALT: 21 U/L / AST: 34 U/L / GGT: x             CAPILLARY BLOOD GLUCOSE          Creatine Kinase, Serum: 288 U/L ( @ 05:10)  CKMB Units: 28.1 ng/mL ( @ 05:10)  CKMB Units: 40.7 ng/mL ( @ 21:15)  Creatine Kinase, Serum: 383 U/L ( @ 21:15)  Creatine Kinase, Serum: 353 U/L ( @ 13:02)  CKMB Units: 36.1 ng/mL ( @ 13:02)    CARDIAC MARKERS ( 2021 05:10 )  x     / x     / 288 U/L / x     / 28.1 ng/mL  CARDIAC MARKERS ( 2021 21:15 )  x     / x     / 383 U/L / x     / 40.7 ng/mL  CARDIAC MARKERS ( 2021 13:02 )  x     / x     / 353 U/L / x     / 36.1 ng/mL      Urinalysis Basic - ( 2021 14:05 )    Color: Light Yellow / Appearance: Clear / SG: >1.050 / pH: x  Gluc: x / Ketone: Negative  / Bili: Negative / Urobili: Negative   Blood: x / Protein: Trace / Nitrite: Negative   Leuk Esterase: Negative / RBC: x / WBC x   Sq Epi: x / Non Sq Epi: x / Bacteria: x          RADIOLOGY & ADDITIONAL TESTS:  CXR:        Care Discussed with Consultants/Other Providers [ x] YES  [ ] NO           Patient is a 56y old  Male who presents with a chief complaint of STEMI cardiac cath (2021 20:56)    HPI:  56y M pmhx HTN originally presenting to [facility] for midsternal cp 20min prior to arrival. Standing waiting for [employer] in store sudden sharp midsternal cp radiating to left arm assoc w nausea and sweating. 10/10 chest pain. no fever or chills. No previous hx of MI. In the ED, afebrile, HR 82, RR 20, /104, pulse ox 99. EKG with ST elevations II III aVF and ST depressions V1-V3.  he was given aspirin, brillinta, heparin gtt nitroglycerin and nitropaste and transferred to [facility] for cardiac catheterization     Taken to cath lab found to have moderate prox LAD dz, 99% occlusion OM1. EF 45% with [place] scientific synergy XD 2.75x8mm and 2.94n26vr stents placed.  (2021 12:37)       INTERVAL HPI/OVERNIGHT EVENTS:   No overnight events   Afebrile, hemodynamically stable     Subjective:    ICU Vital Signs Last 24 Hrs  T(C): 37.3 (2021 23:00), Max: 37.3 (2021 23:00)  T(F): 99.1 (2021 23:00), Max: 99.1 (2021 23:00)  HR: 62 (2021 05:00) (54 - 74)  BP: 128/80 (2021 05:00) (85/48 - 160/72)  BP(mean): 97 (2021 05:00) (58 - 114)  ABP: --  ABP(mean): --  RR: 18 (2021 05:00) (0 - 36)  SpO2: 97% (2021 05:00) (85% - 98%)    I&O's Summary    2021 07:01  -  2021 07:00  --------------------------------------------------------  IN: 1410 mL / OUT: 800 mL / NET: 610 mL          Daily Height in cm: 162.56 (2021 11:30)    Daily Weight in k.3 (2021 05:00)    EKG/Telemetry Events:    MEDICATIONS  (STANDING):  aspirin  chewable 81 milliGRAM(s) Oral daily  atorvastatin 80 milliGRAM(s) Oral at bedtime  chlorhexidine 4% Liquid 1 Application(s) Topical <User Schedule>  enoxaparin Injectable 40 milliGRAM(s) SubCutaneous daily  metoprolol succinate ER 25 milliGRAM(s) Oral daily  ticagrelor 90 milliGRAM(s) Oral every 12 hours    MEDICATIONS  (PRN):      PHYSICAL EXAM:  GENERAL:   HEAD:  Atraumatic, Normocephalic  EYES: EOMI, PERRLA, conjunctiva and sclera clear  NECK: Supple, No JVD, Normal thyroid, no enlarged nodes  NERVOUS SYSTEM:  Alert & Awake.   CHEST/LUNG: B/L good air entry; No rales, rhonchi, or wheezing  HEART: S1S2 normal, no S3, Regular rate and rhythm; No murmurs  ABDOMEN: Soft, Nontender, Nondistended; Bowel sounds present  EXTREMITIES:  R Femoral bandage nontender. No hemorrhage. 2+ Peripheral Pulses, No clubbing, cyanosis, or edema  LYMPH: No lymphadenopathy noted  SKIN: No rashes or lesions    LABS:                        15.8   10.13 )-----------( 219      ( 2021 05:10 )             46.7     07-04    137  |  102  |  10  ----------------------------<  158<H>  4.3   |  22  |  0.65    Ca    9.2      2021 05:10  Phos  3.2     07-04  Mg     2.2     07-04    TPro  6.5  /  Alb  3.8  /  TBili  0.5  /  DBili  x   /  AST  34  /  ALT  21  /  AlkPhos  70  07-04    LIVER FUNCTIONS - ( 2021 05:10 )  Alb: 3.8 g/dL / Pro: 6.5 g/dL / ALK PHOS: 70 U/L / ALT: 21 U/L / AST: 34 U/L / GGT: x             CAPILLARY BLOOD GLUCOSE          Creatine Kinase, Serum: 288 U/L ( @ 05:10)  CKMB Units: 28.1 ng/mL ( @ 05:10)  CKMB Units: 40.7 ng/mL ( @ 21:15)  Creatine Kinase, Serum: 383 U/L ( @ 21:15)  Creatine Kinase, Serum: 353 U/L ( @ 13:02)  CKMB Units: 36.1 ng/mL ( @ 13:02)    CARDIAC MARKERS ( 2021 05:10 )  x     / x     / 288 U/L / x     / 28.1 ng/mL  CARDIAC MARKERS ( 2021 21:15 )  x     / x     / 383 U/L / x     / 40.7 ng/mL  CARDIAC MARKERS ( 2021 13:02 )  x     / x     / 353 U/L / x     / 36.1 ng/mL      Urinalysis Basic - ( 2021 14:05 )    Color: Light Yellow / Appearance: Clear / SG: >1.050 / pH: x  Gluc: x / Ketone: Negative  / Bili: Negative / Urobili: Negative   Blood: x / Protein: Trace / Nitrite: Negative   Leuk Esterase: Negative / RBC: x / WBC x   Sq Epi: x / Non Sq Epi: x / Bacteria: x          RADIOLOGY & ADDITIONAL TESTS:  CXR:        Care Discussed with Consultants/Other Providers [ x] YES  [ ] NO

## 2021-07-05 RX ORDER — CLOPIDOGREL BISULFATE 75 MG/1
1 TABLET, FILM COATED ORAL
Qty: 90 | Refills: 2
Start: 2021-07-05 | End: 2022-03-31

## 2021-07-05 RX ORDER — ATORVASTATIN CALCIUM 80 MG/1
1 TABLET, FILM COATED ORAL
Qty: 30 | Refills: 2
Start: 2021-07-05 | End: 2021-10-02

## 2021-07-05 RX ORDER — METFORMIN HYDROCHLORIDE 850 MG/1
1 TABLET ORAL
Qty: 60 | Refills: 2
Start: 2021-07-05 | End: 2021-10-02

## 2021-07-05 RX ORDER — ASPIRIN/CALCIUM CARB/MAGNESIUM 324 MG
1 TABLET ORAL
Qty: 30 | Refills: 0
Start: 2021-07-05 | End: 2021-08-03

## 2021-07-05 RX ORDER — LOSARTAN POTASSIUM 100 MG/1
1 TABLET, FILM COATED ORAL
Qty: 30 | Refills: 0
Start: 2021-07-05 | End: 2021-08-03

## 2021-07-05 RX ORDER — METOPROLOL TARTRATE 50 MG
1 TABLET ORAL
Qty: 30 | Refills: 0
Start: 2021-07-05 | End: 2021-08-03

## 2021-07-12 ENCOUNTER — APPOINTMENT (OUTPATIENT)
Dept: CARDIOLOGY | Facility: CLINIC | Age: 56
End: 2021-07-12
Payer: MEDICARE

## 2021-07-12 ENCOUNTER — NON-APPOINTMENT (OUTPATIENT)
Age: 56
End: 2021-07-12

## 2021-07-12 VITALS
HEIGHT: 64 IN | SYSTOLIC BLOOD PRESSURE: 106 MMHG | BODY MASS INDEX: 33.8 KG/M2 | WEIGHT: 198 LBS | RESPIRATION RATE: 16 BRPM | HEART RATE: 63 BPM | TEMPERATURE: 98.2 F | OXYGEN SATURATION: 97 % | DIASTOLIC BLOOD PRESSURE: 67 MMHG

## 2021-07-12 PROCEDURE — 99204 OFFICE O/P NEW MOD 45 MIN: CPT

## 2021-07-12 PROCEDURE — 93000 ELECTROCARDIOGRAM COMPLETE: CPT

## 2021-07-14 NOTE — HISTORY OF PRESENT ILLNESS
[FreeTextEntry1] : 56M IWSTEMI s/p PARTH 7/3/2021, pre-DM, HLD, former tobacco user who presents to establish cardiac care post-hospitalization.\par \par Initially presented to [facility] for chest pain, found to have IWSTEMI and transferred to [facility] For cardiac cath. Hospitalized at [facility] from 7/3/21-7/4/21. s/p PARTH x 2 to OM1 with residual non-obstructive disease.\par \par Since then has been doing well. No chest pain, SOB, STAPLES, LE edema, orthopnea, or PND.\par Ticagrelor switched to clopidogrel due to medication coverage.\par \par No fam hx of early CAD or SCD\par

## 2021-07-14 NOTE — DISCUSSION/SUMMARY
[FreeTextEntry1] : LORI s/p DESx 2 7/3/2021\par pre-DM\par HLD\par \par -cont asa 81mg daily, clopidogrel 75mg daily. stressed importance of DAPT adherence. cont atorvastatin\par -preserved LVEF with WMA s/p MI. cont metoprolol succinate 25mg daily and losartan 25mg daily.\par -cont metformin 500mg BID\par \par We discussed healthy lifestyle, including a heart healthy diet rich in whole foods, including fruits, vegetables, and legumes, decreasing overall meat intake and favoring lean meats low in saturated fat such as fish and skinless chicken, and decreasing overall sodium intake to no more than 2000mg daily. We discussed incorporating exercise gradually as tolerated.\par \par f/u in three months. fasting lipid profile at that time\par pt to establish care with PCP

## 2021-07-14 NOTE — CARDIOLOGY SUMMARY
[de-identified] : 7/12/2021: SR, RBBB, ant ischemia [de-identified] : 7/4/2021:\par Conclusions: \par 1. Normal mitral valve. Minimal mitral regurgitation. \par 2. Calcified trileaflet aortic valve with normal opening. \par No aortic valve regurgitation seen. \par 3. Endocardial visualization enhanced with intravenous \par injection of Ultrasonic Enhancing Agent (Definity). Overall \par preserved left ventricular systolic function. The mid to \par distal inferolateral wall is hypokinetic. No left \par ventricular thrombus. \par 4. The right ventricle is not well visualized; grossly \par normal right ventricular size and systolic function.  [de-identified] : 7/3   VENTRICLES: EF estimated was 40 %.   CORONARY VESSELS: The coronary circulation is left dominant.   LM:   --  LM: Angiography showed minor luminal irregularities with no flow   limiting lesions.   LAD:   --  Proximal LAD: There was a 20 % stenosis. In a second lesion,   there was a 40 % stenosis.   --  Mid LAD: Angiography showed minor luminal irregularities with no flow   limiting lesions.   --  D1: There was a 60 % stenosis.   --  D2: There was a 50 % stenosis.   CX:   --  Proximal circumflex: Angiography showed minor luminal   irregularities with no flow limiting lesions.   --  OM1: There was a 90 % stenosis.   RCA:   --  Proximal RCA: The vessel was very small sized. There was a 70 %   stenosis.   COMPLICATIONS: There were no complications.   DIAGNOSTIC RECOMMENDATIONS: Successful PCI to the OM.   DAPT for one year in setting of ACS.

## 2021-12-07 PROBLEM — I10 ESSENTIAL (PRIMARY) HYPERTENSION: Chronic | Status: ACTIVE | Noted: 2021-07-03

## 2021-12-10 ENCOUNTER — APPOINTMENT (OUTPATIENT)
Dept: CARDIOLOGY | Facility: CLINIC | Age: 56
End: 2021-12-10
Payer: MEDICARE

## 2021-12-10 ENCOUNTER — NON-APPOINTMENT (OUTPATIENT)
Age: 56
End: 2021-12-10

## 2021-12-10 VITALS
HEIGHT: 64 IN | HEART RATE: 57 BPM | WEIGHT: 196 LBS | DIASTOLIC BLOOD PRESSURE: 68 MMHG | OXYGEN SATURATION: 96 % | TEMPERATURE: 98.4 F | SYSTOLIC BLOOD PRESSURE: 131 MMHG | BODY MASS INDEX: 33.46 KG/M2

## 2021-12-10 PROCEDURE — 93000 ELECTROCARDIOGRAM COMPLETE: CPT

## 2021-12-10 PROCEDURE — 99214 OFFICE O/P EST MOD 30 MIN: CPT

## 2021-12-10 NOTE — HISTORY OF PRESENT ILLNESS
[FreeTextEntry1] : 56M IWSTEMI s/p PARTH 7/3/2021, pre-DM, HLD, former tobacco user who presents for follow-up.\par \par Initially presented to [facility] for chest pain, found to have IWSTEMI and transferred to [facility] For cardiac cath. Hospitalized at [facility] from 7/3/21-7/4/21. s/p PARTH x 2 to OM1 with residual non-obstructive disease.\par \par Since then has been doing well. No chest pain, SOB, STAPLES, LE edema, orthopnea, or PND.\par Continuing on DAPT\par Relatively sedentary\par \par No fam hx of early CAD or SCD\par \par \par

## 2021-12-10 NOTE — DISCUSSION/SUMMARY
[FreeTextEntry1] : LORI s/p DESx 2 7/3/2021\par pre-DM\par HLD\par \par -cont asa 81mg daily, clopidogrel 75mg daily. stressed importance of DAPT adherence. cont atorvastatin\par -preserved LVEF with WMA s/p MI. cont metoprolol succinate 25mg daily and losartan 25mg daily.\par -cont metformin 500mg BID\par -continue improvements in heart healthy lifestyle. remains abstinent from tobacco. still drinking soda.\par -increase physical activity as tolerated. stationary bike may be better for back.\par -est care with PCP\par -check a1c, lipid, CMP, CBC\par -LDL goal < 70\par \par f/u in six months

## 2021-12-10 NOTE — CARDIOLOGY SUMMARY
[de-identified] : 7/12/2021: SR, RBBB, ant ischemia\par 12/10/2021:GRACE ALLEN [de-identified] : 7/4/2021:\par Conclusions: \par 1. Normal mitral valve. Minimal mitral regurgitation. \par 2. Calcified trileaflet aortic valve with normal opening. \par No aortic valve regurgitation seen. \par 3. Endocardial visualization enhanced with intravenous \par injection of Ultrasonic Enhancing Agent (Definity). Overall \par preserved left ventricular systolic function. The mid to \par distal inferolateral wall is hypokinetic. No left \par ventricular thrombus. \par 4. The right ventricle is not well visualized; grossly \par normal right ventricular size and systolic function.  [de-identified] : 7/2021\par PARTH x 2 to OM1 with residual non-obstructive disease.

## 2021-12-13 ENCOUNTER — TRANSCRIPTION ENCOUNTER (OUTPATIENT)
Age: 56
End: 2021-12-13

## 2021-12-15 ENCOUNTER — TRANSCRIPTION ENCOUNTER (OUTPATIENT)
Age: 56
End: 2021-12-15

## 2021-12-15 ENCOUNTER — MESSAGE (OUTPATIENT)
Age: 56
End: 2021-12-15

## 2021-12-15 LAB
ALBUMIN SERPL ELPH-MCNC: 4.5 G/DL
ALP BLD-CCNC: 124 U/L
ALT SERPL-CCNC: 18 U/L
ANION GAP SERPL CALC-SCNC: 12 MMOL/L
AST SERPL-CCNC: 12 U/L
BASOPHILS # BLD AUTO: 0.04 K/UL
BASOPHILS NFR BLD AUTO: 0.6 %
BILIRUB SERPL-MCNC: 0.5 MG/DL
BUN SERPL-MCNC: 11 MG/DL
CALCIUM SERPL-MCNC: 9.3 MG/DL
CHLORIDE SERPL-SCNC: 101 MMOL/L
CHOLEST SERPL-MCNC: 144 MG/DL
CO2 SERPL-SCNC: 25 MMOL/L
CREAT SERPL-MCNC: 0.73 MG/DL
EOSINOPHIL # BLD AUTO: 0.34 K/UL
EOSINOPHIL NFR BLD AUTO: 5.3 %
ESTIMATED AVERAGE GLUCOSE: 160 MG/DL
GLUCOSE SERPL-MCNC: 256 MG/DL
HBA1C MFR BLD HPLC: 7.2 %
HCT VFR BLD CALC: 46.1 %
HDLC SERPL-MCNC: 40 MG/DL
HGB BLD-MCNC: 15.2 G/DL
IMM GRANULOCYTES NFR BLD AUTO: 0.2 %
LDLC SERPL CALC-MCNC: 66 MG/DL
LYMPHOCYTES # BLD AUTO: 2.26 K/UL
LYMPHOCYTES NFR BLD AUTO: 35.1 %
MAN DIFF?: NORMAL
MCHC RBC-ENTMCNC: 30 PG
MCHC RBC-ENTMCNC: 33 GM/DL
MCV RBC AUTO: 90.9 FL
MONOCYTES # BLD AUTO: 0.48 K/UL
MONOCYTES NFR BLD AUTO: 7.5 %
NEUTROPHILS # BLD AUTO: 3.31 K/UL
NEUTROPHILS NFR BLD AUTO: 51.3 %
NONHDLC SERPL-MCNC: 104 MG/DL
PLATELET # BLD AUTO: 221 K/UL
POTASSIUM SERPL-SCNC: 4.5 MMOL/L
PROT SERPL-MCNC: 6.9 G/DL
RBC # BLD: 5.07 M/UL
RBC # FLD: 12.4 %
SODIUM SERPL-SCNC: 138 MMOL/L
TRIGL SERPL-MCNC: 190 MG/DL
WBC # FLD AUTO: 6.44 K/UL

## 2022-03-28 ENCOUNTER — RX RENEWAL (OUTPATIENT)
Age: 57
End: 2022-03-28

## 2022-07-21 RX ORDER — METFORMIN HYDROCHLORIDE 500 MG/1
500 TABLET, COATED ORAL TWICE DAILY
Qty: 180 | Refills: 3 | Status: ACTIVE | COMMUNITY
Start: 2021-07-12 | End: 1900-01-01

## 2022-08-18 ENCOUNTER — APPOINTMENT (OUTPATIENT)
Dept: CARDIOLOGY | Facility: CLINIC | Age: 57
End: 2022-08-18

## 2022-08-18 ENCOUNTER — NON-APPOINTMENT (OUTPATIENT)
Age: 57
End: 2022-08-18

## 2022-08-18 VITALS
BODY MASS INDEX: 32.61 KG/M2 | WEIGHT: 191 LBS | SYSTOLIC BLOOD PRESSURE: 118 MMHG | DIASTOLIC BLOOD PRESSURE: 70 MMHG | HEIGHT: 64 IN | TEMPERATURE: 98.3 F | OXYGEN SATURATION: 96 % | HEART RATE: 50 BPM

## 2022-08-18 PROCEDURE — 93000 ELECTROCARDIOGRAM COMPLETE: CPT

## 2022-08-18 PROCEDURE — 99214 OFFICE O/P EST MOD 30 MIN: CPT

## 2022-08-18 RX ORDER — CLOPIDOGREL BISULFATE 75 MG/1
75 TABLET, FILM COATED ORAL
Qty: 90 | Refills: 0 | Status: DISCONTINUED | COMMUNITY
Start: 2021-07-12 | End: 2022-08-18

## 2022-08-18 RX ORDER — ASPIRIN ENTERIC COATED TABLETS 81 MG 81 MG/1
81 TABLET, DELAYED RELEASE ORAL DAILY
Qty: 90 | Refills: 3 | Status: ACTIVE | COMMUNITY
Start: 2021-07-12 | End: 1900-01-01

## 2022-08-18 RX ORDER — ASPIRIN 81 MG/1
81 TABLET ORAL
Qty: 90 | Refills: 3 | Status: DISCONTINUED | COMMUNITY
Start: 2022-03-28 | End: 2022-08-18

## 2022-08-18 NOTE — CARDIOLOGY SUMMARY
[de-identified] : 7/12/2021: SR, RBBB, ant ischemia\par 12/10/2021:SR, RBBB\par 8/18/22: SR, RBBB [de-identified] : 7/4/2021:\par Conclusions: \par 1. Normal mitral valve. Minimal mitral regurgitation. \par 2. Calcified trileaflet aortic valve with normal opening. \par No aortic valve regurgitation seen. \par 3. Endocardial visualization enhanced with intravenous \par injection of Ultrasonic Enhancing Agent (Definity). Overall \par preserved left ventricular systolic function. The mid to \par distal inferolateral wall is hypokinetic. No left \par ventricular thrombus. \par 4. The right ventricle is not well visualized; grossly \par normal right ventricular size and systolic function.  [de-identified] : 7/2021\par PARTH x 2 to OM1 with residual non-obstructive disease.

## 2022-08-18 NOTE — HISTORY OF PRESENT ILLNESS
[FreeTextEntry1] : 57M CAD, IWSTEMI s/p PARTH 7/3/2021, pre-DM, HLD, former tobacco user who presents for follow-up.\par \par Initially presented to [facility] for chest pain, found to have IWSTEMI and transferred to [facility] For cardiac cath. Hospitalized at [facility] from 7/3/21-7/4/21. s/p PARTH x 2 to OM1 with residual non-obstructive disease.\par \par Since then has been doing well. No chest pain, SOB, STAPLES, LE edema, orthopnea, or PND.\par Continuing on DAPT\par \par No fam hx of early CAD or SCD

## 2022-08-18 NOTE — DISCUSSION/SUMMARY
[FreeTextEntry1] : IWSTEMI s/p DESx 2 7/3/2021\par pre-DM\par HLD\par \par -cont asa 81mg daily, stop clopidogrel as the pt is >1 year out from ACS\par -cont atorvastatin, LDL goal < 70\par -preserved LVEF with WMA s/p MI. cont metoprolol succinate 25mg daily and losartan 25mg daily.\par -cont metformin 500mg BID\par \par f/u annually [EKG obtained to assist in diagnosis and management of assessed problem(s)] : EKG obtained to assist in diagnosis and management of assessed problem(s)

## 2022-09-08 ENCOUNTER — APPOINTMENT (OUTPATIENT)
Dept: INTERNAL MEDICINE | Facility: CLINIC | Age: 57
End: 2022-09-08

## 2022-12-01 ENCOUNTER — APPOINTMENT (OUTPATIENT)
Dept: INTERNAL MEDICINE | Facility: CLINIC | Age: 57
End: 2022-12-01

## 2023-01-13 ENCOUNTER — APPOINTMENT (OUTPATIENT)
Dept: CARDIOLOGY | Facility: CLINIC | Age: 58
End: 2023-01-13

## 2023-02-17 ENCOUNTER — APPOINTMENT (OUTPATIENT)
Dept: CARDIOLOGY | Facility: CLINIC | Age: 58
End: 2023-02-17
Payer: MEDICARE

## 2023-02-17 ENCOUNTER — NON-APPOINTMENT (OUTPATIENT)
Age: 58
End: 2023-02-17

## 2023-02-17 VITALS
SYSTOLIC BLOOD PRESSURE: 131 MMHG | BODY MASS INDEX: 30.9 KG/M2 | HEART RATE: 60 BPM | WEIGHT: 181 LBS | HEIGHT: 64 IN | DIASTOLIC BLOOD PRESSURE: 70 MMHG | OXYGEN SATURATION: 96 % | TEMPERATURE: 98.6 F

## 2023-02-17 DIAGNOSIS — R73.03 PREDIABETES.: ICD-10-CM

## 2023-02-17 DIAGNOSIS — Z87.891 PERSONAL HISTORY OF NICOTINE DEPENDENCE: ICD-10-CM

## 2023-02-17 DIAGNOSIS — I25.10 ATHEROSCLEROTIC HEART DISEASE OF NATIVE CORONARY ARTERY W/OUT ANGINA PECTORIS: ICD-10-CM

## 2023-02-17 DIAGNOSIS — E78.5 HYPERLIPIDEMIA, UNSPECIFIED: ICD-10-CM

## 2023-02-17 DIAGNOSIS — E11.69 TYPE 2 DIABETES MELLITUS WITH OTHER SPECIFIED COMPLICATION: ICD-10-CM

## 2023-02-17 DIAGNOSIS — Z79.4 TYPE 2 DIABETES MELLITUS WITH OTHER SPECIFIED COMPLICATION: ICD-10-CM

## 2023-02-17 DIAGNOSIS — F17.200 NICOTINE DEPENDENCE, UNSPECIFIED, UNCOMPLICATED: ICD-10-CM

## 2023-02-17 PROCEDURE — 99214 OFFICE O/P EST MOD 30 MIN: CPT

## 2023-02-17 PROCEDURE — 93000 ELECTROCARDIOGRAM COMPLETE: CPT

## 2023-02-17 RX ORDER — INSULIN ASPART 100 [IU]/ML
100 INJECTION, SOLUTION INTRAVENOUS; SUBCUTANEOUS
Refills: 0 | Status: ACTIVE | COMMUNITY

## 2023-02-17 NOTE — DISCUSSION/SUMMARY
[EKG obtained to assist in diagnosis and management of assessed problem(s)] : EKG obtained to assist in diagnosis and management of assessed problem(s) [FreeTextEntry1] : IWSTEMI s/p DESx 2 7/3/2021\par DM\par HLD\par \par -cont asa 81mg daily\par -cont atorvastatin, LDL goal < 70\par -preserved LVEF with WMA s/p MI. cont metoprolol succinate 25mg daily and losartan 25mg daily.\par -cont metformin\par -should trial GLP1Ra or SGLT2i for cardiac health\par \par bloodwork to be sent here\par LDL goal < 70\par if not at goal will increase lipid therapy

## 2023-02-17 NOTE — CARDIOLOGY SUMMARY
[de-identified] : 7/12/2021: SR, RBBB, ant ischemia\par 12/10/2021:SR, RBBB\par 8/18/22: SR, RBBB\par 2/17/23: SB, RBBB\par  [de-identified] : 7/4/2021:\par Conclusions: \par 1. Normal mitral valve. Minimal mitral regurgitation. \par 2. Calcified trileaflet aortic valve with normal opening. \par No aortic valve regurgitation seen. \par 3. Endocardial visualization enhanced with intravenous \par injection of Ultrasonic Enhancing Agent (Definity). Overall \par preserved left ventricular systolic function. The mid to \par distal inferolateral wall is hypokinetic. No left \par ventricular thrombus. \par 4. The right ventricle is not well visualized; grossly \par normal right ventricular size and systolic function.  [de-identified] : 7/2021\par PARTH x 2 to OM1 with residual non-obstructive disease.

## 2023-02-17 NOTE — HISTORY OF PRESENT ILLNESS
[FreeTextEntry1] : 57M CAD, IWSTEMI s/p PARTH 7/3/2021, DM on insulin, HLD, former tobacco user who presents for follow-up.\par \par Initially presented to [facility] for chest pain, found to have IWSTEMI and transferred to [facility] For cardiac cath. Hospitalized at [facility] from 7/3/21-7/4/21. s/p PARTH x 2 to OM1 with residual non-obstructive disease.\par \par Since last visit was diagnosed with DM in the setting of severe hyperglycemia. Was started on Novolin.\par Eating healthier. staying active\par \par No fam hx of early CAD or SCD

## 2023-08-23 ENCOUNTER — RX RENEWAL (OUTPATIENT)
Age: 58
End: 2023-08-23

## 2023-10-26 ENCOUNTER — RX RENEWAL (OUTPATIENT)
Age: 58
End: 2023-10-26

## 2023-11-13 ENCOUNTER — RX RENEWAL (OUTPATIENT)
Age: 58
End: 2023-11-13

## 2023-11-13 RX ORDER — METOPROLOL SUCCINATE 25 MG/1
25 TABLET, EXTENDED RELEASE ORAL
Qty: 90 | Refills: 1 | Status: ACTIVE | COMMUNITY
Start: 2021-07-12 | End: 1900-01-01

## 2024-03-18 ENCOUNTER — RX RENEWAL (OUTPATIENT)
Age: 59
End: 2024-03-18

## 2024-03-18 RX ORDER — ATORVASTATIN CALCIUM 80 MG/1
80 TABLET, FILM COATED ORAL DAILY
Qty: 90 | Refills: 0 | Status: ACTIVE | COMMUNITY
Start: 2021-07-12 | End: 1900-01-01

## 2024-05-15 ENCOUNTER — RX RENEWAL (OUTPATIENT)
Age: 59
End: 2024-05-15

## 2024-05-15 RX ORDER — LOSARTAN POTASSIUM 25 MG/1
25 TABLET, FILM COATED ORAL
Qty: 90 | Refills: 0 | Status: ACTIVE | COMMUNITY
Start: 2021-07-12 | End: 1900-01-01

## 2024-08-16 ENCOUNTER — RX RENEWAL (OUTPATIENT)
Age: 59
End: 2024-08-16

## 2024-08-21 ENCOUNTER — RX RENEWAL (OUTPATIENT)
Age: 59
End: 2024-08-21